# Patient Record
(demographics unavailable — no encounter records)

---

## 2024-11-18 NOTE — ASSESSMENT
[FreeTextEntry1] : Flu shot given today.  Recommended COVID and RSV vaccination at the pharmacy.  Labs to be drawn today.  Check CBC and CMP.  Return to clinic in 4 months

## 2024-11-18 NOTE — PHYSICAL EXAM
[Alert] : alert [Normal Outer Ear/Nose] : the ears and nose were normal in appearance [Normal Appearance] : the appearance of the neck was normal [Supple] : the neck was supple [No Respiratory Distress] : no respiratory distress [No Acc Muscle Use] : no accessory muscle use [Respiration, Rhythm And Depth] : normal respiratory rhythm and effort [Auscultation Breath Sounds / Voice Sounds] : lungs were clear to auscultation bilaterally [Heart Rate And Rhythm] : heart rate was normal and rhythm regular [___ +] : bilateral [unfilled]+ pitting edema to the ankles [Bowel Sounds] : normal bowel sounds [Abdomen Tenderness] : non-tender [Abdomen Soft] : soft [No Spinal Tenderness] : no spinal tenderness [Normal Color / Pigmentation] : normal skin color and pigmentation [Normal Turgor] : normal skin turgor [No Focal Deficits] : no focal deficits [Oriented To Time, Place, And Person] : oriented to person, place, and time [Normal Affect] : the affect was normal [Normal Mood] : the mood was normal [Normal Gait] : abnormal gait [de-identified] : ?  Reducible right inguinal hernia [de-identified] : Left knee brace in place

## 2024-11-18 NOTE — HISTORY OF PRESENT ILLNESS
[Patient declined breast sonogram] : Patient declined osteoporosis screening [Patient reported hearing was normal] : Patient reported hearing was normal [Patient reported vision is normal] : Patient reported vision is normal [Patient reported dental screening is abnormal] : Patient reported dental screening is abnormal [Patient declined colon rectal/cancer screening] : Patient declined colon/rectal cancer screening [No falls in past year] : Patient reported no falls in the past year [0] : 2) Feeling down, depressed, or hopeless: Not at all (0) [PHQ-2 Negative - No further assessment needed] : PHQ-2 Negative - No further assessment needed [With Patient/Caregiver] : , with patient/caregiver [Reviewed no changes] : Reviewed, no changes [Designated Healthcare Proxy] : Designated healthcare proxy [Name: ___] : Health Care Proxy's Name: [unfilled]  [Relationship: ___] : Relationship: [unfilled] [Time Spent: ___ minutes] : Time Spent: [unfilled] minutes [BoneDensityDate] : 09/23 [ColonoscopyDate] : 10/23 [TextBox_43] : Dental caries [FreeTextEntry1] : NEEDS [[id number]] : 0 [AdvancecareDate] : 11/24

## 2024-11-18 NOTE — DATA REVIEWED
[FreeTextEntry1] : As above, personally reviewed CT A/P, EKG, echocardiogram, Holter monitor, and outside labs that were summarized and will be scanned into the EMR

## 2025-01-09 NOTE — LETTER BODY
[FreeTextEntry1] : Peter Conn MD [address] [place] [address] [address], [place] [id number] [phone]  Dear Dr. Conn,  Reason for Visit: BPH. UTI. Previous right testicular pain.  This is a 85 year-old gentleman with symptoms of BPH and UTI. His previous ultrasound demonstrated a small left inguinal hernia. Patient had a negative CT scan. Patient is here for follow-up. Since he was last seen, patient has intermittent right ankle discomfort. He reports taking Flomax. Patient reports taking the medications regularly without any side effects or difficulties with the medication. He reports stable urinary symptoms. He denies any hematuria or urinary incontinence. His symptoms are aggravated by hydration. He denies any alleviating factors. All other review of systems are negative. Patient reports resolution of his right testicular discomfort. He has no cancer in his family medical history. He has no previous surgical history. Past medical history, family history and social history were inquired and were noncontributory to current condition. The patient does not use tobacco or drink alcohol. Medications and allergies were reviewed. He has no known allergies to medication.  On examination, the patient is a healthy-appearing gentleman in no acute distress. He is alert and oriented follows commands. He has normal mood and affect. He is normocephalic. Neck is supple. Oral no thrush Respirations are unlabored. His abdomen is soft and nontender. Bladder is nonpalpable. No CVA tenderness. Neurologically he is grossly intact. No peripheral edema. Skin without gross abnormality.   Post-void residual on bladder scan today was 15 cc.  ASSESSMENT: BPH. UTI. Right testicular pain, resolved.   I counseled the patient. His PVR today was 15 cc. In terms of his BPH, his symptoms are stable on Flomax. I renewed the patient's prescription for Flomax today. I encouraged the patient to continue medications regularly as directed. I recommended the patient repeat PSA and BMP to establish baseline. In terms of his previous UTI, I recommended the patient obtain urinalysis and urine culture to look for infections. The risks and benefits were discussed. Alternatives were given. I answered the patient questions. The patient will follow-up as directed and will contact me with any questions or concerns. Thank you for the opportunity to participate in the care of Mr. DIANA. I will keep you updated on his progress.   Plan: Continue Flomax. PSA. BMP. PVR. Urinalysis. Urine Culture. Follow up in 1 year.  I spent 30-minutes time today on all issues related to this patient on today date of service including non face to face time.

## 2025-01-09 NOTE — LETTER BODY
[FreeTextEntry1] : Peter Conn MD [address] [place] [address] [address], [place] [phone]  Dear Dr. Conn,  Reason for Visit: BPH. UTI. Previous right testicular pain.  This is a 85 year-old gentleman with symptoms of BPH and UTI. His previous ultrasound demonstrated a small left inguinal hernia. Patient had a negative CT scan. Patient is here for follow-up. Since he was last seen, patient has intermittent right ankle discomfort. He reports taking Flomax. Patient reports taking the medications regularly without any side effects or difficulties with the medication. He reports stable urinary symptoms. He denies any hematuria or urinary incontinence. His symptoms are aggravated by hydration. He denies any alleviating factors. All other review of systems are negative. Patient reports resolution of his right testicular discomfort. He has no cancer in his family medical history. He has no previous surgical history. Past medical history, family history and social history were inquired and were noncontributory to current condition. The patient does not use tobacco or drink alcohol. Medications and allergies were reviewed. He has no known allergies to medication.  On examination, the patient is a healthy-appearing gentleman in no acute distress. He is alert and oriented follows commands. He has normal mood and affect. He is normocephalic. Neck is supple. Oral no thrush Respirations are unlabored. His abdomen is soft and nontender. Bladder is nonpalpable. No CVA tenderness. Neurologically he is grossly intact. No peripheral edema. Skin without gross abnormality.   Post-void residual on bladder scan today was 15 cc.  ASSESSMENT: BPH. UTI. Right testicular pain, resolved.   I counseled the patient. His PVR today was 15 cc. In terms of his BPH, his symptoms are stable on Flomax. I renewed the patient's prescription for Flomax today. I encouraged the patient to continue medications regularly as directed. I recommended the patient repeat PSA and BMP to establish baseline. In terms of his previous UTI, I recommended the patient obtain urinalysis and urine culture to look for infections. The risks and benefits were discussed. Alternatives were given. I answered the patient questions. The patient will follow-up as directed and will contact me with any questions or concerns. Thank you for the opportunity to participate in the care of Mr. DIANA. I will keep you updated on his progress.   Plan: Continue Flomax. PSA. BMP. PVR. Urinalysis. Urine Culture. Follow up in 1 year.  I spent 30-minutes time today on all issues related to this patient on today date of service including non face to face time.

## 2025-01-09 NOTE — HISTORY OF PRESENT ILLNESS
[FreeTextEntry1] : Follow-up BPH.  Flomax.  PVR 15 cc.  Previous UTI.  Repeat urine culture.  Patient has intermittent right ankle discomfort.  Prescribed ultrasound demonstrated small left inguinal hernia.  Patient had negative CT scan.  Follow-up 1 year.   Please refer to URO Consult Note.

## 2025-01-09 NOTE — ADDENDUM
[FreeTextEntry1] : Entered by Se Fowler, acting as scribe for Dr. Mathew Jones. The documentation recorded by the scribe accurately reflects the service I personally performed and the decisions made by me.

## 2025-04-29 NOTE — HISTORY OF PRESENT ILLNESS
[FreeTextEntry1] : pt f/u fecal incontinence  Formed stool 1-2 times/day Lower abd discomfort, improved with BM, same as in 2019  Benefiber responsive  History of peptic ulcer, concerned about persistence But takes PPI < 1 / week, supplemented with TUMS  CT scan Augst 2024 (report reviewed) for chronic RLQ abd pain PRostomegaly/bladder thickening. Mesenteric panniculitis  Impression: IBS Mesenteric panniculitis Heartburn Rectal pain, responsive to Tucks/witch hazel  Plan: Observe  Benefiber daily Pepcid and TUMS PRN F/u urology Dr. Mathew Jones for prostate/bladder/lower abd discomfort Office followup 6 months

## 2025-06-26 NOTE — PHYSICAL EXAM
Continue vitamin D, check labs    Orders:    CBC and Auto Differential; Future    Comprehensive Metabolic Panel; Future    Vitamin D 25-Hydroxy,Total (for eval of Vitamin D levels); Future    Albumin-Creatinine Ratio, Urine Random; Future     [Alert] : alert [Normal Outer Ear/Nose] : the ears and nose were normal in appearance [Normal Appearance] : the appearance of the neck was normal [Supple] : the neck was supple [No Respiratory Distress] : no respiratory distress [No Acc Muscle Use] : no accessory muscle use [Respiration, Rhythm And Depth] : normal respiratory rhythm and effort [Heart Rate And Rhythm] : heart rate was normal and rhythm regular [___ +] : bilateral [unfilled]+ pitting edema to the ankles [Bowel Sounds] : normal bowel sounds [Abdomen Tenderness] : non-tender [Abdomen Soft] : soft [No Spinal Tenderness] : no spinal tenderness [Normal Color / Pigmentation] : normal skin color and pigmentation [Normal Turgor] : normal skin turgor [No Focal Deficits] : no focal deficits [Oriented To Time, Place, And Person] : oriented to person, place, and time [Normal Affect] : the affect was normal [Normal Mood] : the mood was normal [Normal Gait] : abnormal gait [de-identified] : Expiratory wheezing heard throughout original auscultation.  Nasal polyps [de-identified] : Diffuse expiratory wheezing [de-identified] : ?  Reducible right inguinal hernia [de-identified] : Left knee brace in place

## 2025-06-26 NOTE — DATA REVIEWED
[FreeTextEntry1] : Reviewed recent notes from GI neurology next Reviewed ER notes, including documentation, labs, EKG, chest x-ray in detail up summarized above.  17-minute spent reviewing medical records

## 2025-06-26 NOTE — ASSESSMENT
[FreeTextEntry1] : Completed handicap parking permit.  Patient has underlying osteoarthritis that limits his gait.  As above, recent labs on June 16, 2025 in the [facility] in the emergency room overall unremarkable.  Only abnormality is mild increase in AST.  Check CMP and A1c with next labs at next visit.  Plan of care discussed with the patient and his wife in detail  Return to the office in 2 to 3 months

## 2025-06-26 NOTE — HISTORY OF PRESENT ILLNESS
[Patient declined breast sonogram] : Patient declined osteoporosis screening [Patient reported hearing was normal] : Patient reported hearing was normal [Patient reported vision is normal] : Patient reported vision is normal [Patient reported dental screening is abnormal] : Patient reported dental screening is abnormal [Patient declined colon rectal/cancer screening] : Patient declined colon/rectal cancer screening [No falls in past year] : Patient reported no falls in the past year [0] : 2) Feeling down, depressed, or hopeless: Not at all (0) [PHQ-2 Negative - No further assessment needed] : PHQ-2 Negative - No further assessment needed [With Patient/Caregiver] : , with patient/caregiver [Reviewed no changes] : Reviewed, no changes [Designated Healthcare Proxy] : Designated healthcare proxy [Name: ___] : Health Care Proxy's Name: [unfilled]  [Relationship: ___] : Relationship: [unfilled] [Time Spent: ___ minutes] : Time Spent: [unfilled] minutes [BoneDensityDate] : 09/23 [ColonoscopyDate] : 10/23 [TextBox_43] : Dental caries [FreeTextEntry1] : NEEDS [[id number]] : 0 [AdvancecareDate] : 06/25

## 2025-07-17 NOTE — HISTORY OF PRESENT ILLNESS
[TextBox_4] : 85-year-old man with a past medical history of CVA x 2 no residual deficits, hypertension.